# Patient Record
Sex: MALE | Race: WHITE | NOT HISPANIC OR LATINO | Employment: FULL TIME | URBAN - METROPOLITAN AREA
[De-identification: names, ages, dates, MRNs, and addresses within clinical notes are randomized per-mention and may not be internally consistent; named-entity substitution may affect disease eponyms.]

---

## 2017-01-08 ENCOUNTER — HOSPITAL ENCOUNTER (EMERGENCY)
Facility: CLINIC | Age: 32
Discharge: HOME | End: 2017-01-08
Attending: EMERGENCY MEDICINE | Admitting: EMERGENCY MEDICINE
Payer: COMMERCIAL

## 2017-01-08 VITALS
TEMPERATURE: 97.9 F | DIASTOLIC BLOOD PRESSURE: 92 MMHG | RESPIRATION RATE: 16 BRPM | SYSTOLIC BLOOD PRESSURE: 138 MMHG | HEART RATE: 65 BPM | OXYGEN SATURATION: 97 %

## 2017-01-08 DIAGNOSIS — S33.9XXA SPRAIN AND STRAIN OF LUMBOSACRAL JOINT/LIGAMENT, INITIAL ENCOUNTER: ICD-10-CM

## 2017-01-08 PROCEDURE — 99283 EMERGENCY DEPT VISIT LOW MDM: CPT | Performed by: EMERGENCY MEDICINE

## 2017-01-08 PROCEDURE — 99283 EMERGENCY DEPT VISIT LOW MDM: CPT

## 2017-01-08 RX ORDER — HYDROCODONE BITARTRATE AND ACETAMINOPHEN 5; 325 MG/1; MG/1
1 TABLET ORAL EVERY 4 HOURS PRN
Qty: 20 TABLET | Refills: 0 | Status: SHIPPED | OUTPATIENT
Start: 2017-01-08 | End: 2017-01-13

## 2017-01-08 ASSESSMENT — ENCOUNTER SYMPTOMS: BACK PAIN: 1

## 2017-01-08 NOTE — DISCHARGE INSTRUCTIONS
Ibuprofen for mild to moderate pain  Activity as tolerated-lifting as discussed/demonstrated  May apply ice to low back for discomfort  Norco as directed for severe pain  While on narcotics the patient should avoid driving, operating machinery, climbing, and alcohol.   Follow-up:  primary clinic provider -2 weeks if not resolved

## 2017-01-08 NOTE — ED AVS SNAPSHOT
[facility] Emergency Department    [address]    [facility] [address]    Phone:  [phone]    Fax:  [phone]                                       Julio Mccurdy   MRN: [id number]    Department:  [facility] Emergency Department   Date of Visit:  1/8/2017           Patient Information     Date Of Birth          1985        Your diagnoses for this visit were:     Sprain and strain of lumbosacral joint/ligament, initial encounter        You were seen by Sage Herrera DO.      Follow-up Information     Schedule an appointment as soon as possible for a visit with Clinic, [facility].    Why:  As needed    Contact information:    [facility] [facility] [id number] [phone]          Discharge Instructions       Ibuprofen for mild to moderate pain  Activity as tolerated-lifting as discussed/demonstrated  May apply ice to low back for discomfort  Norco as directed for severe pain  While on narcotics the patient should avoid driving, operating machinery, climbing, and alcohol.   Follow-up:  primary clinic provider -2 weeks if not resolved        24 Hour Appointment Hotline       To make an appointment at any [facility], call [phone] ([phone]). If you don't have a family doctor or clinic, we will help you find one. [facility] are conveniently located to serve the needs of you and your family.             Review of your medicines      CONTINUE these medicines which may have CHANGED, or have new prescriptions. If we are uncertain of the size of tablets/capsules you have at home, strength may be listed as something that might have changed.        Dose / Directions Last dose taken    HYDROcodone-acetaminophen 5-325 MG per tablet   Commonly known as:  NORCO   Dose:  1 tablet   What changed:  how much to take   Quantity:  20 tablet        Take 1 tablet by mouth every 4 hours as needed for moderate to severe pain   Refills:  0          Our records show  "that you are taking the medicines listed below. If these are incorrect, please call your family doctor or clinic.        Dose / Directions Last dose taken    albuterol 90 MCG/ACT inhaler   Dose:  2 puff   Quantity:  1 Inhaler        Inhale 2 puffs into the lungs every 4 hours as needed for shortness of breath / dyspnea.   Refills:  1        NO ACTIVE MEDICATIONS        Refills:  0                Prescriptions were sent or printed at these locations (1 Prescription)                   Other Prescriptions                Printed at Department/Unit printer (1 of 1)         HYDROcodone-acetaminophen (NORCO) 5-325 MG per tablet                Orders Needing Specimen Collection     None      Pending Results     No orders found from 2017 to 2017.            Pending Culture Results     No orders found from 2017 to 2017.             Test Results from your hospital stay            Thank you for choosing [facility]       Thank you for choosing [facility] for your care. Our goal is always to provide you with excellent care. Hearing back from our patients is one way we can continue to improve our services. Please take a few minutes to complete the written survey that you may receive in the mail after you visit with us. Thank you!        [facility] Information     [facility] lets you send messages to your doctor, view your test results, renew your prescriptions, schedule appointments and more. To sign up, go to [email or web address] . Click on \"Log in\" on the left side of the screen, which will take you to the Welcome page. Then click on \"Sign up Now\" on the right side of the page.     You will be asked to enter the access code listed below, as well as some personal information. Please follow the directions to create your username and password.     Your access code is: [email or web address]  Expires: 2017  9:17 AM     Your access code will  in 90 days. If you need help or a new code, please call your [facility] clinic or " [phone].        Care EveryWhere ID     This is your Care EveryWhere ID. This could be used by other organizations to access your [facility] medical records  [id number]        After Visit Summary       This is your record. Keep this with you and show to your community pharmacist(s) and doctor(s) at your next visit.

## 2017-01-08 NOTE — ED PROVIDER NOTES
"  History     Chief Complaint   Patient presents with     Back Pain     pain in low back after it \"popped\" last week.     HPI     Julio Mccurdy is a 31 year old male who presents to the emergency department for back pain. Patient explains the pain began last Wednesday, 1/4/17, after it \"popped.\" He admits that his back is sore on occasion but denies any major problems. Denies midline back pain or pain moving from his back down to legs. Patient has been taking ibuprofen to alleviate the pain. Patient is employed as a  which he believes may be contributing to his pain.     There is no problem list on file for this patient.    Current Outpatient Prescriptions   Medication Sig Dispense Refill     HYDROcodone-acetaminophen (NORCO) 5-325 MG per tablet Take 1 tablet by mouth every 4 hours as needed for moderate to severe pain 20 tablet 0     NO ACTIVE MEDICATIONS        albuterol 90 MCG/ACT inhaler Inhale 2 puffs into the lungs every 4 hours as needed for shortness of breath / dyspnea. 1 Inhaler 1     No Known Allergies    I have reviewed the Medications, Allergies, Past Medical and Surgical History, and Social History in the Epic system.    Review of Systems   Musculoskeletal: Positive for back pain (Lower ).     Constitutional: Negative.    HENT: Negative.    Eyes: Negative.    Respiratory: Negative.    Cardiovascular: Negative.    Gastrointestinal: Negative.    Endocrine: Negative.    Genitourinary: Negative.    Musculoskeletal: Negative.    Skin: Negative.    Allergic/Immunologic: Negative.    Neurological: Negative.    Hematological: Negative.    Psychiatric/Behavioral: Negative.    All other systems reviewed and are negative.      Physical Exam     BP: (!) 138/92 mmHg  Pulse: 65  Temp: 97.9  F (36.6  C)  Resp: 16  SpO2: 97 %    Physical Exam    No difficulty moving from sitting to standing position  Maintains normal upright posture  Normal cervical lordosis, thoracic kyphosis, lumbar lordosis  No " scoliosis  No midline pain palpating and percussing cervical thoracic lumbar , vertebrae in midline  Normal forward flexion, rotation left and right and side bending.  Some increased low back pain with extension  Lower extremities were normal muscle tone and bulk strength reflexes and sensation.  SLR testing negative  Skin color tone temperature lower extremities normal  ED Course   Procedures                 Labs Ordered and Resulted from Time of ED Arrival Up to the Time of Departure from the ED - No data to display  No results found for this or any previous visit (from the past 24 hour(s)).    Medications - No data to display    9:04 AM Patient Assessed.       Assessments & Plan (with Medical Decision Making)  Lumbar strain.  No discogenic component.  No radicular component.    Advise conservative care> If still bothersome and 2-33 weeks recommend following up with primary clinic provider.     I have reviewed the nursing notes.    I have reviewed the findings, diagnosis, plan and need for follow up with the patient.    Discharge Medication List as of 1/8/2017  9:18 AM          Final diagnoses:   Sprain and strain of lumbosacral joint/ligament, initial encounter     This document serves as a record of the services and decisions personally performed and made by Sage Herrera, *. It was created on HIS/HER behalf by Jaz Bowen, a trained medical scribe. The creation of this document is based the provider's statements to the medical scribe.  Jaz Bowen 9:04 AM 1/8/2017    Provider:   The information in this document, created by the medical scribe for me, accurately reflects the services I personally performed and the decisions made by me. I have reviewed and approved this document for accuracy prior to leaving the patient care area.  Sage Herrera, * 9:04 AM 1/8/2017 1/8/2017   [facility] EMERGENCY DEPARTMENT      Sage Herrera, DO  01/08/17 2014

## 2017-01-08 NOTE — ED AVS SNAPSHOT
[facility] Emergency Department    [address] [address]    Phone:  [phone]    Fax:  [phone]                                       Julio Mccurdy   MRN: [id number]    Department:  [facility] Emergency Department   Date of Visit:  1/8/2017           After Visit Summary Signature Page     I have received my discharge instructions, and my questions have been answered. I have discussed any challenges I see with this plan with the nurse or doctor.    ..........................................................................................................................................  Patient/Patient Representative Signature      ..........................................................................................................................................  Patient Representative Print Name and Relationship to Patient    ..................................................               ................................................  Date                                            Time    ..........................................................................................................................................  Reviewed by Signature/Title    ...................................................              ..............................................  Date                                                            Time

## 2017-10-02 ENCOUNTER — HOSPITAL ENCOUNTER (EMERGENCY)
Facility: CLINIC | Age: 32
Discharge: HOME | End: 2017-10-02
Attending: NURSE PRACTITIONER | Admitting: NURSE PRACTITIONER
Payer: COMMERCIAL

## 2017-10-02 VITALS
SYSTOLIC BLOOD PRESSURE: 128 MMHG | OXYGEN SATURATION: 98 % | RESPIRATION RATE: 20 BRPM | BODY MASS INDEX: 21.98 KG/M2 | TEMPERATURE: 98.2 F | HEART RATE: 87 BPM | HEIGHT: 68 IN | DIASTOLIC BLOOD PRESSURE: 84 MMHG | WEIGHT: 145 LBS

## 2017-10-02 DIAGNOSIS — S33.5XXA LUMBAR SPRAIN, INITIAL ENCOUNTER: Primary | ICD-10-CM

## 2017-10-02 DIAGNOSIS — S23.9XXA THORACIC SPRAIN: ICD-10-CM

## 2017-10-02 PROCEDURE — 99212 OFFICE O/P EST SF 10 MIN: CPT

## 2017-10-02 PROCEDURE — 99213 OFFICE O/P EST LOW 20 MIN: CPT | Performed by: NURSE PRACTITIONER

## 2017-10-02 RX ORDER — OXYCODONE AND ACETAMINOPHEN 5; 325 MG/1; MG/1
1 TABLET ORAL EVERY 4 HOURS PRN
Qty: 18 TABLET | Refills: 0 | Status: SHIPPED | OUTPATIENT
Start: 2017-10-02

## 2017-10-02 RX ORDER — KETOROLAC TROMETHAMINE 30 MG/ML
30 INJECTION, SOLUTION INTRAMUSCULAR; INTRAVENOUS ONCE
Status: DISCONTINUED | OUTPATIENT
Start: 2017-10-02 | End: 2017-10-02 | Stop reason: HOSPADM

## 2017-10-02 RX ORDER — KETOROLAC TROMETHAMINE 10 MG/1
10 TABLET, FILM COATED ORAL EVERY 6 HOURS PRN
Qty: 20 TABLET | Refills: 0 | Status: SHIPPED | OUTPATIENT
Start: 2017-10-02

## 2017-10-02 NOTE — ED AVS SNAPSHOT
[facility] Emergency Department    [address] [address]    Phone:  [phone]    Fax:  [phone]                                       Julio Mccurdy   MRN: [id number]    Department:  [facility] Emergency Department   Date of Visit:  10/2/2017           After Visit Summary Signature Page     I have received my discharge instructions, and my questions have been answered. I have discussed any challenges I see with this plan with the nurse or doctor.    ..........................................................................................................................................  Patient/Patient Representative Signature      ..........................................................................................................................................  Patient Representative Print Name and Relationship to Patient    ..................................................               ................................................  Date                                            Time    ..........................................................................................................................................  Reviewed by Signature/Title    ...................................................              ..............................................  Date                                                            Time

## 2017-10-02 NOTE — LETTER
[facility] EMERGENCY DEPARTMENT  [address] [address]  Phone: [phone]  Fax: [phone]    October 2, 2017        Julio Mccurdy  [address] [place] [address]          To whom it may concern:    RE: Julio Mccurdy    Patient was seen and treated today at our clinic and missed work.  Patient may return to work 10/4/2017 with the following:  No working or lifting restrictions    Please contact me for questions or concerns.      Sincerely,        Nikia SARMIENTO, CNM, FNP, DNP

## 2017-10-02 NOTE — ED PROVIDER NOTES
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  Pt states his back was tight this am and progressively got worse.  He states the pain is in the right lower back without radiation down the legs.  He describes the pain as stabbing.  He states it hurts to walk and sit.  The pain decreases with laying flat.  He went to the chiropractor and it did not help.  He reports taking tylenol 1000 mg and 800 mg of ibuprofen today without relief.  Pt reports last episode of back pain was about one year ago;  He states he came here and was advised there was nothing wrong and sent him out the door.    Pt reports occupation is .  Denies active medical problems.  Denies daily prescription medications.  Denies alcohol and recreational drug use and admits to smoking since age 13.  I have reviewed the Medications, Allergies, Past Medical and Surgical History, and Social History in the Epic system.    Review of Systems  10 point ROS of systems including Constitutional, Eyes, Respiratory, Cardiovascular, Gastroenterology, Genitourinary, Integumentary, Muscularskeletal, Psychiatric were all negative except for pertinent positives noted in my HPI.    Physical Exam   BP: 128/84  Pulse: 87  Heart Rate: 87  Temp: 98.2  F (36.8  C)  Resp: 20  Height: 172.7 cm (5' 8\")  Weight: 65.8 kg (145 lb)  SpO2: 98 %  Physical Exam   Constitutional: He appears well-developed and well-nourished. No distress.   Cardiovascular: Normal rate, regular rhythm and normal heart sounds.  Exam reveals no gallop and no friction rub.    No murmur heard.  Pulmonary/Chest: Effort normal and breath sounds normal. No respiratory distress. He has no wheezes. He has no rales. He exhibits no tenderness.   Musculoskeletal:        Thoracic back: He exhibits tenderness, pain and spasm. He exhibits normal range of motion, " no bony tenderness, no swelling, no edema, no deformity (lower thoracic paraspinous region) and no laceration (lower thoracic right worse than left paraspinous region).        Lumbar back: He exhibits tenderness (lumbar region right side), pain and spasm. He exhibits normal range of motion, no bony tenderness, no swelling (lumbar region right side), no edema, no deformity (paraspinous muscle region right side) and no laceration.   Neurovascularly intact  Bilateral straight leg raise normal  Spine inspected and normal curvature noted  Strength equal   Skin: He is not diaphoretic.   Psychiatric: He has a normal mood and affect. His behavior is normal.   Nursing note and vitals reviewed.      ED Course     ED Course     Procedures    Labs Ordered and Resulted from Time of ED Arrival Up to the Time of Departure from the ED - No data to display    Assessments & Plan (with Medical Decision Making)     I have reviewed the nursing notes.    I have reviewed the findings, diagnosis, plan and need for follow up with the patient.  Julio Mccurdy is a 32 year old male who presents with back pain.  Pt states his back was tight this am and progressively got worse.  He states the pain is in the right lower back without radiation down the legs.  He describes the pain as stabbing.  He states it hurts to walk and sit.  The pain decreases with laying flat.  He went to the chiropractor and it did not help.  He reports taking tylenol 1000 mg and 800 mg of ibuprofen today without relief.  Pt reports last episode of back pain was about one year ago;  He states he came here and was advised there was nothing wrong and sent him out the door.    Pt reports occupation is .  Denies active medical problems.  Denies daily prescription medications.  Denies alcohol and recreational drug use and admits to smoking since age 13.  Exam as noted above; no red flags to indicate imaging. DDx: acute muscle strain, muscle spasm, herniated disc,  cauda equina, spinal fracture, spinal stenosis, sciatica, degenerative disease, ligamentous injury, spondylolisthesis, epidural abscess, osteomyelitis, AAA, abdominal etiologies .  Reviewed pain management and recommend start core body strengthening to prevent further problems in the future.    Discharge Medication List as of 10/2/2017  6:48 PM      START taking these medications    Details   oxyCODONE-acetaminophen (PERCOCET) 5-325 MG per tablet Take 1 tablet by mouth every 4 hours as needed for pain maximum 6 tablet(s) per day, Disp-18 tablet, R-0, Local Print      ketorolac (TORADOL) 10 MG tablet Take 1 tablet (10 mg) by mouth every 6 hours as needed for moderate pain, Disp-20 tablet, R-0, E-Prescribe             Final diagnoses:   Thoracic sprain   Lumbar sprain, initial encounter       10/2/2017   [facility] EMERGENCY DEPARTMENT     Nikia Cobb APRN CNP  10/02/17 2028

## 2017-10-02 NOTE — ED AVS SNAPSHOT
[facility] Emergency Department    [address]    [facility] [place] [address]    Phone:  [phone]    Fax:  [phone]                                       Julio Mccurdy   MRN: [id number]    Department:  [facility] Emergency Department   Date of Visit:  10/2/2017           Patient Information     Date Of Birth          1985        Your diagnoses for this visit were:     Thoracic sprain     Lumbar sprain, initial encounter        You were seen by Nikia Cobb APRN CNP.      Follow-up Information     Follow up with [facility], [facility] In 1 week.    Why:  If symptoms worsen, As needed    Contact information:    [facility] [address]  [phone]          Discharge Instructions         Use toradol 10 mg every 6-8 hours per day if needed for pain. Stop if it is causing nausea or abdominal pain.   Add Percocet 5-325 (oxycodone-acetaminophen) 1-2 pills up to every 4 hours if needed for pain. Do not use alcohol, operate machinery, drive, or climb on ladders for 8 hours after taking Percocet. Use docusate (100mg) 2 times a day to prevent constipation while on narcotics.    Possible Causes of Low Back or Leg Pain    The symptoms in your back or leg may be due to pressure on a nerve. This pressure may be caused by a damaged disk or by abnormal bone growth. Either way, you may feel pain, burning, tingling, or numbness. If you have pressure on a nerve that connects to the sciatic nerve, pain may shoot down your leg.    Pressure from the disk  Constant wear and tear can weaken a disk over time and cause back pain. The disk can then be damaged by a sudden movement or injury. If its soft center begins to bulge, the disk may press on a nerve. Or the outside of the disk may tear, and the soft center may squeeze through and pinch a nerve.    Pressure from bone  As a disk wears out, the vertebrae right above and below the disk begin to touch. This can put pressure on a nerve.  Often, abnormal bone (called bone spurs) grows where the vertebrae rub against each other. This can cause the foramen or the spinal canal to narrow (called stenosis) and press against a nerve.  Date Last Reviewed: 10/4/2015    1550-2051 [employer]. [address] [place] [address], [place] [address]. All rights reserved. This information is not intended as a substitute for professional medical care. Always follow your healthcare professional's instructions.          Relieving Back Pain  Back pain is a common problem. You can strain back muscles by lifting too much weight or just by moving the wrong way. Back strain can be uncomfortable, even painful. And it can take weeks or months to improve. To help yourself feel better and prevent future back strains, try these tips.  Important Note: Do not give aspirin to children or teens without first discussing it with your healthcare provider.      ? Ice    Ice reduces muscle pain and swelling. It helps most during the first 24 to 48 hours after an injury.    Wrap an ice pack or a bag of frozen peas in a thin towel. (Never place ice directly on your skin.)    Place the ice where your back hurts the most.    Don t ice for more than 20 minutes at a time.    You can use ice several times a day.  ? Medicines  Over-the-counter pain relievers can include acetaminophen and anti-inflammatory medicines, which includes aspirin or ibuprofen. They can help ease discomfort. Some also reduce swelling.    Tell your healthcare provider about any medicines you are already taking.    Take medicines only as directed.  ? Heat  After the first 48 hours, heat can relax sore muscles and improve blood flow.    Try a warm bath or shower. Or use a heating pad set on low. To prevent a burn, keep a cloth between you and the heating pad.    Don t use a heating pad for more than 15 minutes at a time. Never sleep on a heating pad.  Date Last Reviewed: 9/1/2015 2000-2017 [employer]  [address] [place] [address]. All rights reserved. This information is not intended as a substitute for professional medical care. Always follow your healthcare professional's instructions.          24 Hour Appointment Hotline       To make an appointment at any [facility], call [phone] ([phone]). If you don't have a family doctor or clinic, we will help you find one. [facility] clinics are conveniently located to serve the needs of you and your family.             Review of your medicines      START taking        Dose / Directions Last dose taken    ketorolac 10 MG tablet   Commonly known as:  TORADOL   Dose:  10 mg   Quantity:  20 tablet        Take 1 tablet (10 mg) by mouth every 6 hours as needed for moderate pain   Refills:  0        oxyCODONE-acetaminophen 5-325 MG per tablet   Commonly known as:  PERCOCET   Dose:  1 tablet   Quantity:  18 tablet        Take 1 tablet by mouth every 4 hours as needed for pain maximum 6 tablet(s) per day   Refills:  0          Our records show that you are taking the medicines listed below. If these are incorrect, please call your family doctor or clinic.        Dose / Directions Last dose taken    albuterol 90 MCG/ACT inhaler   Dose:  2 puff   Quantity:  1 Inhaler        Inhale 2 puffs into the lungs every 4 hours as needed for shortness of breath / dyspnea.   Refills:  1        NO ACTIVE MEDICATIONS        Refills:  0                Prescriptions were sent or printed at these locations (2 Prescriptions)                   [facility] [address]   [address] [facility] [address]    Telephone:  [phone]   Fax:  [phone]   Hours:                  E-Prescribed (1 of 2)         ketorolac (TORADOL) 10 MG tablet                 Printed at Department/Unit printer (1 of 2)         oxyCODONE-acetaminophen (PERCOCET) 5-325 MG per tablet                Orders Needing Specimen Collection     None      Pending  "Results     No orders found from 2017 to 10/3/2017.            Pending Culture Results     No orders found from 2017 to 10/3/2017.            Pending Results Instructions     If you had any lab results that were not finalized at the time of your Discharge, you can call the ED Lab Result RN at [phone]. You will be contacted by this team for any positive Lab results or changes in treatment. The nurses are available 7 days a week from 10A to 6:30P.  You can leave a message 24 hours per day and they will return your call.        Test Results From Your Hospital Stay               Thank you for choosing [facility]       Thank you for choosing [facility] for your care. Our goal is always to provide you with excellent care. Hearing back from our patients is one way we can continue to improve our services. Please take a few minutes to complete the written survey that you may receive in the mail after you visit with us. Thank you!        [employer] Information     [employer]Stage lets you send messages to your doctor, view your test results, renew your prescriptions, schedule appointments and more. To sign up, go to [email or web address] . Click on \"Log in\" on the left side of the screen, which will take you to the Welcome page. Then click on \"Sign up Now\" on the right side of the page.     You will be asked to enter the access code listed below, as well as some personal information. Please follow the directions to create your username and password.     Your access code is: [email or web address]  Expires: 2017  6:48 PM     Your access code will  in 90 days. If you need help or a new code, please call your [facility] clinic or [phone].        Care EveryWhere ID     This is your Care EveryWhere ID. This could be used by other organizations to access your [facility] medical records  [id number]        Equal Access to Services     ALEXANDER BELL : gisel Méndez, fernie baptiste, " carol gonzalez'aan ah. So [facility] [phone].    ATENCIÓN: Si habla español, tiene a sebastian disposición servicios gratuitos de asistencia lingüística. Llame al [phone].    We comply with applicable federal civil rights laws and [place] laws. We do not discriminate on the basis of race, color, national origin, age, disability, sex, sexual orientation, or gender identity.            After Visit Summary       This is your record. Keep this with you and show to your community pharmacist(s) and doctor(s) at your next visit.

## 2017-10-02 NOTE — DISCHARGE INSTRUCTIONS
Use toradol 10 mg every 6-8 hours per day if needed for pain. Stop if it is causing nausea or abdominal pain.   Add Percocet 5-325 (oxycodone-acetaminophen) 1-2 pills up to every 4 hours if needed for pain. Do not use alcohol, operate machinery, drive, or climb on ladders for 8 hours after taking Percocet. Use docusate (100mg) 2 times a day to prevent constipation while on narcotics.    Possible Causes of Low Back or Leg Pain    The symptoms in your back or leg may be due to pressure on a nerve. This pressure may be caused by a damaged disk or by abnormal bone growth. Either way, you may feel pain, burning, tingling, or numbness. If you have pressure on a nerve that connects to the sciatic nerve, pain may shoot down your leg.    Pressure from the disk  Constant wear and tear can weaken a disk over time and cause back pain. The disk can then be damaged by a sudden movement or injury. If its soft center begins to bulge, the disk may press on a nerve. Or the outside of the disk may tear, and the soft center may squeeze through and pinch a nerve.    Pressure from bone  As a disk wears out, the vertebrae right above and below the disk begin to touch. This can put pressure on a nerve. Often, abnormal bone (called bone spurs) grows where the vertebrae rub against each other. This can cause the foramen or the spinal canal to narrow (called stenosis) and press against a nerve.  Date Last Reviewed: 10/4/2015    6543-9026 [employer]. [address] [place] [address], [place] [address]. All rights reserved. This information is not intended as a substitute for professional medical care. Always follow your healthcare professional's instructions.          Relieving Back Pain  Back pain is a common problem. You can strain back muscles by lifting too much weight or just by moving the wrong way. Back strain can be uncomfortable, even painful. And it can take weeks or months to improve. To help yourself feel better and prevent  future back strains, try these tips.  Important Note: Do not give aspirin to children or teens without first discussing it with your healthcare provider.      ? Ice    Ice reduces muscle pain and swelling. It helps most during the first 24 to 48 hours after an injury.    Wrap an ice pack or a bag of frozen peas in a thin towel. (Never place ice directly on your skin.)    Place the ice where your back hurts the most.    Don t ice for more than 20 minutes at a time.    You can use ice several times a day.  ? Medicines  Over-the-counter pain relievers can include acetaminophen and anti-inflammatory medicines, which includes aspirin or ibuprofen. They can help ease discomfort. Some also reduce swelling.    Tell your healthcare provider about any medicines you are already taking.    Take medicines only as directed.  ? Heat  After the first 48 hours, heat can relax sore muscles and improve blood flow.    Try a warm bath or shower. Or use a heating pad set on low. To prevent a burn, keep a cloth between you and the heating pad.    Don t use a heating pad for more than 15 minutes at a time. Never sleep on a heating pad.  Date Last Reviewed: 9/1/2015 2000-2017 [employer]. [address] [place] [address], [place] [address]. All rights reserved. This information is not intended as a substitute for professional medical care. Always follow your healthcare professional's instructions.

## 2017-10-14 ENCOUNTER — HOSPITAL ENCOUNTER (EMERGENCY)
Facility: CLINIC | Age: 32
Discharge: HOME | End: 2017-10-14
Attending: FAMILY MEDICINE | Admitting: FAMILY MEDICINE
Payer: COMMERCIAL

## 2017-10-14 VITALS
TEMPERATURE: 97.9 F | HEART RATE: 79 BPM | WEIGHT: 145.06 LBS | RESPIRATION RATE: 16 BRPM | BODY MASS INDEX: 22.06 KG/M2 | OXYGEN SATURATION: 100 % | DIASTOLIC BLOOD PRESSURE: 67 MMHG | SYSTOLIC BLOOD PRESSURE: 120 MMHG

## 2017-10-14 DIAGNOSIS — M54.50 ACUTE MIDLINE LOW BACK PAIN WITHOUT SCIATICA: ICD-10-CM

## 2017-10-14 PROCEDURE — 25000132 ZZH RX MED GY IP 250 OP 250 PS 637: Performed by: FAMILY MEDICINE

## 2017-10-14 PROCEDURE — 99283 EMERGENCY DEPT VISIT LOW MDM: CPT | Mod: Z6 | Performed by: FAMILY MEDICINE

## 2017-10-14 PROCEDURE — 99283 EMERGENCY DEPT VISIT LOW MDM: CPT

## 2017-10-14 RX ORDER — SULINDAC 200 MG/1
200 TABLET ORAL ONCE
Status: COMPLETED | OUTPATIENT
Start: 2017-10-14 | End: 2017-10-14

## 2017-10-14 RX ORDER — LIDOCAINE 50 MG/G
1 PATCH TOPICAL
Status: DISCONTINUED | OUTPATIENT
Start: 2017-10-14 | End: 2017-10-14 | Stop reason: HOSPADM

## 2017-10-14 RX ORDER — SULINDAC 200 MG/1
200 TABLET ORAL 2 TIMES DAILY WITH MEALS
Qty: 20 TABLET | Refills: 0 | Status: SHIPPED | OUTPATIENT
Start: 2017-10-14

## 2017-10-14 RX ADMIN — LIDOCAINE 1 PATCH: 50 PATCH TOPICAL at 13:54

## 2017-10-14 RX ADMIN — SULINDAC 200 MG: 200 TABLET ORAL at 13:55

## 2017-10-14 ASSESSMENT — ENCOUNTER SYMPTOMS
DYSURIA: 0
SORE THROAT: 0
PALPITATIONS: 0
ABDOMINAL PAIN: 0
WHEEZING: 0
VOMITING: 0
SHORTNESS OF BREATH: 0
NAUSEA: 0
SINUS PRESSURE: 0
DIAPHORESIS: 0
HEADACHES: 0
COUGH: 0
CHILLS: 0
BACK PAIN: 1
CONSTIPATION: 0
BLOOD IN STOOL: 0
DIARRHEA: 0
FEVER: 0
FREQUENCY: 0

## 2017-10-14 NOTE — DISCHARGE INSTRUCTIONS
ICD-10-CM    1. Acute midline low back pain without sciatica M54.5     Suspec disc injury without nerve impingement.  Return for incontinence/retention of urine ot stool, foot drop, numbness inner thighs Take sulindac 200 mg twice daily for 10 days with food or milk. do not use with ketorolac or ibuprofen.  Maintain low back range of motion.  Physical therapy consult - consider traction..   Lidoderm patch midline. may replace with OTC Lidocaine 4% patch. off 12 hours of every 24 hours.         Back Pain (Acute or Chronic)    Back pain is one of the most common problems. The good news is that most people feel better in 1 to 2 weeks, and most of the rest in 1 to 2 months. Most people can remain active.  People experience and describe pain differently; not everyone is the same.    The pain can be sharp, stabbing, shooting, aching, cramping or burning.    Movement, standing, bending, lifting, sitting, or walking may worsen pain.    It can be localized to one spot or area, or it can be more generalized.    It can spread or radiate upwards, to the front, or go down your arms or legs (sciatica).    It can cause muscle spasm.  Most of the time, mechanical problems with the muscles or spine cause the pain. Mechanical problems are usually caused by an injury to the muscles or ligaments. While illness can cause back pain, it is usually not caused by a serious illness. Mechanical problems include:     Physical activity such as sports, exercise, work, or normal activity    Overexertion, lifting, pushing, pulling incorrectly or too aggressively    Sudden twisting, bending, or stretching from an accident, or accidental movement    Poor posture    Stretching or moving wrong, without noticing pain at the time    Poor coordination, lack of regular exercise (check with your doctor about this)    Spinal disc disease or arthritis    Stress  Pain can also be related to pregnancy, or illness like appendicitis, bladder or kidney  infections, pelvic infections, and many other things.  Acute back pain usually gets better in 1 to 2 weeks. Back pain related to disk disease, arthritis in the spinal joints or spinal stenosis (narrowing of the spinal canal) can become chronic and last for months or years.  Unless you had a physical injury (for example, a car accident or fall) X-rays are usually not needed for the initial evaluation of back pain. If pain continues and does not respond to medical treatment, X-rays and other tests may be needed.  Home care  Try these home care recommendations:    When in bed, try to find a position of comfort. A firm mattress is best. Try lying flat on your back with pillows under your knees. You can also try lying on your side with your knees bent up towards your chest and a pillow between your knees.    At first, do not try to stretch out the sore spots. If there is a strain, it is not like the good soreness you get after exercising without an injury. In this case, stretching may make it worse.    Avoid prolong sitting, long car rides, or travel. This puts more stress on the lower back than standing or walking.    During the first 24 to 72 hours after an acute injury or flare up of chronic back pain, apply an ice pack to the painful area for 20 minutes and then remove it for 20 minutes. Do this over a period of 60 to 90 minutes or several times a day. This will reduce swelling and pain. Wrap the ice pack in a thin towel or plastic to protect your skin.    You can start with ice, then switch to heat. Heat (hot shower, hot bath, or heating pad) reduces pain and works well for muscle spasms. Heat can be applied to the painful area for 20 minutes then remove it for 20 minutes. Do this over a period of 60 to 90 minutes or several times a day. Do not sleep on a heating pad. It can lead to skin burns or tissue damage.    You can alternate ice and heat therapy. Talk with your doctor about the best treatment for your back  pain.    Therapeutic massage can help relax the back muscles without stretching them.    Be aware of safe lifting methods and do not lift anything without stretching first.  Medicines  Talk to your doctor before using medicine, especially if you have other medical problems or are taking other medicines.    You may use over-the-counter medicine as directed on the bottle to control pain, unless another pain medicine was prescribed. If you have chronic conditions like diabetes, liver or kidney disease, stomach ulcers, or gastrointestinal bleeding, or are taking blood thinners, talk to your doctor before taking any medicine.    Be careful if you are given a prescription medicines, narcotics, or medicine for muscle spasms. They can cause drowsiness, affect your coordination, reflexes, and judgement. Do not drive or operate heavy machinery.  Follow-up care  Follow up with your healthcare provider, or as advised.   A radiologist will review any X-rays that were taken. Your provide will notify you of any new findings that may affect your care.  Call 911  Call emergency services if any of the following occur:    Trouble breathing    Confusion    Very drowsy or trouble awakening    Fainting or loss of consciousness    Rapid or very slow heart rate    Loss of bowel or bladder control  When to seek medical advice  Call your healthcare provider right away if any of these occur:     Pain becomes worse or spreads to your legs    Weakness or numbness in one or both legs    Numbness in the groin or genital area  Date Last Reviewed: 7/1/2016 2000-2017 [employer]. [address] [place] [address]. All rights reserved. This information is not intended as a substitute for professional medical care. Always follow your healthcare professional's instructions.

## 2017-10-14 NOTE — ED NOTES
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 He states the pain is just a little bit better then 2 weeks ago

## 2017-10-14 NOTE — ED AVS SNAPSHOT
[facility] Emergency Department    [address] [facility] [address]    Phone:  [phone]    Fax:  [phone]                                       Julio Mccurdy   MRN: [id number]    Department:  [facility] Emergency Department   Date of Visit:  10/14/2017           After Visit Summary Signature Page     I have received my discharge instructions, and my questions have been answered. I have discussed any challenges I see with this plan with the nurse or doctor.    ..........................................................................................................................................  Patient/Patient Representative Signature      ..........................................................................................................................................  Patient Representative Print Name and Relationship to Patient    ..................................................               ................................................  Date                                            Time    ..........................................................................................................................................  Reviewed by Signature/Title    ...................................................              ..............................................  Date                                                            Time

## 2017-10-14 NOTE — ED PROVIDER NOTES
History     Chief Complaint   Patient presents with     Back Pain     low back pain for 2 weeks, seen in  not getting better.     HPI  Julio Mccurdy is a 32 year old male who was seen in the  here for low back pain 10/2 and again at [facility]  onset with tightening in the am on 10/2 and RT low back without radiculopathy and stabbing pain, worse with sitting and walking. better with lying flat. saw chiro - no benefit.  Taking ibu and tylenol.  prior low back pain 1 year prior.    since those visits persistent pain low back and bilateral without radiculopathy.   no injury. works as  but without obvious acute injury.   Tylenol, and percocet, and toradol, flexeril since then   No back surgery. No fever  No symptoms suggestive of  cauda equina syndrome (central spinal stenosis) such as incontinence or retention of urine or stool, inner thigh numbness or foot drop.   NO ureterolithaisis    I have reviewed the Medications, Allergies, Past Medical and Surgical History, and Social History in the Epic system.    Allergies: No Known Allergies      No current facility-administered medications on file prior to encounter.   Current Outpatient Prescriptions on File Prior to Encounter:  oxyCODONE-acetaminophen (PERCOCET) 5-325 MG per tablet Take 1 tablet by mouth every 4 hours as needed for pain maximum 6 tablet(s) per day   ketorolac (TORADOL) 10 MG tablet Take 1 tablet (10 mg) by mouth every 6 hours as needed for moderate pain   NO ACTIVE MEDICATIONS    albuterol 90 MCG/ACT inhaler Inhale 2 puffs into the lungs every 4 hours as needed for shortness of breath / dyspnea.       There is no problem list on file for this patient.      No past surgical history on file.    Social History   Substance Use Topics     Smoking status: Current Every Day Smoker     Packs/day: 1.00     Smokeless tobacco: Former User     Alcohol use No         There is no immunization history on file for this patient.    BMI: Estimated  "body mass index is 22.06 kg/(m^2) as calculated from the following:    Height as of 10/2/17: 1.727 m (5' 8\").    Weight as of this encounter: 65.8 kg (145 lb 1 oz).      Review of Systems   Constitutional: Negative for chills, diaphoresis and fever.   HENT: Negative for ear pain, sinus pressure and sore throat.    Eyes: Negative for visual disturbance.   Respiratory: Negative for cough, shortness of breath and wheezing.    Cardiovascular: Negative for chest pain and palpitations.   Gastrointestinal: Negative for abdominal pain, blood in stool, constipation, diarrhea, nausea and vomiting.   Genitourinary: Negative for dysuria, frequency and urgency.   Musculoskeletal: Positive for back pain.   Skin: Negative for rash.   Neurological: Negative for headaches.   All other systems reviewed and are negative.      Physical Exam   BP: 136/85  Pulse: 79  Temp: 97.9  F (36.6  C)  Resp: 16  Weight: 65.8 kg (145 lb 1 oz)  SpO2: 100 %       Physical Exam   GENERAL: Alert and mild-mod distress  CARDIOVASCULAR: Peripheral pulses are palpable  GASTROINTESTINAL: No abdominal tenderness, mass or organomegaly    MUSCULOSKELTAL:  Lumbosacral spine area reveals local tenderness low lumbar back.  Pain is worse with forward bending but not with lateral bending, extension, rotation.  Straight leg raise      Sitting: Left (-) Right (-)      Lying: Left (-) Right (-)  SI Joint Testing      Figure of four testing (-)    Other joints      Hips: full range of motion without pain.      Knees: full range of motion without pain.    NEURO:      Deep Tendon Reflexes: Present and symmetric      Motor strength: 5/5 throughout      Sensation to light touch intact      Heel and toe gait normal      ED Course     ED Course     Procedures               Critical Care time:  none               Labs Ordered and Resulted from Time of ED Arrival Up to the Time of Departure from the ED - No data to display    Assessments & Plan (with Medical Decision Making) "     MDM: Julio Mccurdy is a 32 year old male who presented with low back pain for the last 2 weeks with persistent symptoms but not radicular and without red flag symptoms or signs.  I do suspect this is likely a lumbar disc without radicular symptoms.  Symptoms are worse while he is sitting.  He has no associated paraspinous muscle spasm.  He has no indications for imaging at this time.  I did recommend he follow up in clinic with sports medicine or with his primary doctor.  Consults been given.  I also recommended physical therapy or chiropractor.  Discussed other symptomatic management.  Cautions given for return.    I have reviewed the nursing notes.    I have reviewed the findings, diagnosis, plan and need for follow up with the patient.       New Prescriptions    No medications on file       Final diagnoses:   Acute midline low back pain without sciatica - Suspec disc injury without nerve impingement.  Return for incontinence/retention of urine ot stool, foot drop, numbness inner thighs Take sulindac 200 mg twice daily for 10 days with food or milk. do not use with ketorolac or ibuprofen.  Maintain low back range of motion.  Physical therapy consult - consider traction..   Lidoderm patch midline. may replace with OTC Lidocaine 4% patch. off 12 hours of every 24 hours.       10/14/2017   [facility] EMERGENCY DEPARTMENT     Bhupinder Bynum MD  10/14/17 2015

## 2017-10-14 NOTE — ED AVS SNAPSHOT
[facility] Emergency Department    [address] [address]    Phone:  [phone]    Fax:  [phone]                                       Julio Mccurdy   MRN: [id number]    Department:  [facility] Emergency Department   Date of Visit:  10/14/2017           Patient Information     Date Of Birth          1985        Your diagnoses for this visit were:     Acute midline low back pain without sciatica Suspec disc injury without nerve impingement.  Return for incontinence/retention of urine ot stool, foot drop, numbness inner thighs Take sulindac 200 mg twice daily for 10 days with food or milk. do not use with ketorolac or ibuprofen.  Maintain low back range of motion.  Physical therapy consult - consider traction..   Lidoderm patch midline. may replace with OTC Lidocaine 4% patch. off 12 hours of every 24 hours.       You were seen by Bhupinder Bynum MD.      Follow-up Information     Follow up with [facility] Emergency Department.    Specialty:  EMERGENCY MEDICINE    Why:  As needed, If symptoms worsen    Contact information:    [address] [place] [id number] [phone]    Additional information:    The medical center is located at   [address] (between [address] in [place], [place] miles north   of [place]).        Follow up with Clinic, [facility] [place] In 1 week.    Contact information:    [address] [place] [id number] [phone]          Discharge Instructions         ICD-10-CM    1. Acute midline low back pain without sciatica M54.5     Suspec disc injury without nerve impingement.  Return for incontinence/retention of urine ot stool, foot drop, numbness inner thighs Take sulindac 200 mg twice daily for 10 days with food or milk. do not use with ketorolac or ibuprofen.  Maintain low back range of motion.  Physical therapy consult - consider traction..   Lidoderm patch midline. may replace with OTC Lidocaine 4%  patch. off 12 hours of every 24 hours.         Back Pain (Acute or Chronic)    Back pain is one of the most common problems. The good news is that most people feel better in 1 to 2 weeks, and most of the rest in 1 to 2 months. Most people can remain active.  People experience and describe pain differently; not everyone is the same.    The pain can be sharp, stabbing, shooting, aching, cramping or burning.    Movement, standing, bending, lifting, sitting, or walking may worsen pain.    It can be localized to one spot or area, or it can be more generalized.    It can spread or radiate upwards, to the front, or go down your arms or legs (sciatica).    It can cause muscle spasm.  Most of the time, mechanical problems with the muscles or spine cause the pain. Mechanical problems are usually caused by an injury to the muscles or ligaments. While illness can cause back pain, it is usually not caused by a serious illness. Mechanical problems include:     Physical activity such as sports, exercise, work, or normal activity    Overexertion, lifting, pushing, pulling incorrectly or too aggressively    Sudden twisting, bending, or stretching from an accident, or accidental movement    Poor posture    Stretching or moving wrong, without noticing pain at the time    Poor coordination, lack of regular exercise (check with your doctor about this)    Spinal disc disease or arthritis    Stress  Pain can also be related to pregnancy, or illness like appendicitis, bladder or kidney infections, pelvic infections, and many other things.  Acute back pain usually gets better in 1 to 2 weeks. Back pain related to disk disease, arthritis in the spinal joints or spinal stenosis (narrowing of the spinal canal) can become chronic and last for months or years.  Unless you had a physical injury (for example, a car accident or fall) X-rays are usually not needed for the initial evaluation of back pain. If pain continues and does not respond to  medical treatment, X-rays and other tests may be needed.  Home care  Try these home care recommendations:    When in bed, try to find a position of comfort. A firm mattress is best. Try lying flat on your back with pillows under your knees. You can also try lying on your side with your knees bent up towards your chest and a pillow between your knees.    At first, do not try to stretch out the sore spots. If there is a strain, it is not like the good soreness you get after exercising without an injury. In this case, stretching may make it worse.    Avoid prolong sitting, long car rides, or travel. This puts more stress on the lower back than standing or walking.    During the first 24 to 72 hours after an acute injury or flare up of chronic back pain, apply an ice pack to the painful area for 20 minutes and then remove it for 20 minutes. Do this over a period of 60 to 90 minutes or several times a day. This will reduce swelling and pain. Wrap the ice pack in a thin towel or plastic to protect your skin.    You can start with ice, then switch to heat. Heat (hot shower, hot bath, or heating pad) reduces pain and works well for muscle spasms. Heat can be applied to the painful area for 20 minutes then remove it for 20 minutes. Do this over a period of 60 to 90 minutes or several times a day. Do not sleep on a heating pad. It can lead to skin burns or tissue damage.    You can alternate ice and heat therapy. Talk with your doctor about the best treatment for your back pain.    Therapeutic massage can help relax the back muscles without stretching them.    Be aware of safe lifting methods and do not lift anything without stretching first.  Medicines  Talk to your doctor before using medicine, especially if you have other medical problems or are taking other medicines.    You may use over-the-counter medicine as directed on the bottle to control pain, unless another pain medicine was prescribed. If you have chronic  conditions like diabetes, liver or kidney disease, stomach ulcers, or gastrointestinal bleeding, or are taking blood thinners, talk to your doctor before taking any medicine.    Be careful if you are given a prescription medicines, narcotics, or medicine for muscle spasms. They can cause drowsiness, affect your coordination, reflexes, and judgement. Do not drive or operate heavy machinery.  Follow-up care  Follow up with your healthcare provider, or as advised.   A radiologist will review any X-rays that were taken. Your provide will notify you of any new findings that may affect your care.  Call 911  Call emergency services if any of the following occur:    Trouble breathing    Confusion    Very drowsy or trouble awakening    Fainting or loss of consciousness    Rapid or very slow heart rate    Loss of bowel or bladder control  When to seek medical advice  Call your healthcare provider right away if any of these occur:     Pain becomes worse or spreads to your legs    Weakness or numbness in one or both legs    Numbness in the groin or genital area  Date Last Reviewed: 7/1/2016 2000-2017 [employer]. [address] [place] [address]. All rights reserved. This information is not intended as a substitute for professional medical care. Always follow your healthcare professional's instructions.          24 Hour Appointment Hotline       To make an appointment at any [facility] clinic, call [phone] ([phone]). If you don't have a family doctor or clinic, we will help you find one. [facility] clinics are conveniently located to serve the needs of you and your family.          ED Discharge Orders     [facility] PT, HAND, AND CHIR[facility]RACTIC REFERRAL       **This order will print in the [facility] Scheduling Office**    Physical Therapy, Hand Therapy and Chiropractic Care are available through:    *[facility]  *[facility]  *[facility]    Call one  number to schedule at any of the above locations: [phone].    Your provider has referred you to: [facility] - PT and/or Chiropractic Care determined by clinical presentation at [facility] or [facility]    Indication/Reason for Referral: lumbar back pain  Onset of Illness: October 2 , 2017   Therapy Orders: Evaluate and Treat  Special Programs:   Special Request:     Aleshia Monk      Additional Comments for the Therapist or Chiropractor: consider empiric traction trial  longterm prevention strategy     Please be aware that coverage of these services is subject to the terms and limitations of your health insurance plan.  Call member services at your health plan with any benefit or coverage questions.      Please bring the following to your appointment:    *Your personal calendar for scheduling future appointments  *Comfortable clothing            ORTHO  REFERRAL       [facility] is referring you to the Orthopedic  Services at [facility].       The  Representative will assist you in the coordination of your Orthopedic and Musculoskeletal Care as prescribed by your physician.    The  Representative will call you within 1 business day to help schedule your appointment, or you may contact the  Representative at:    All areas ~ [phone]     Type of Referral : Non Surgical       Timeframe requested: Within 1-2 weeks    Coverage of these services is subject to the terms and limitations of your health insurance plan.  Please call member services at your health plan with any benefit or coverage questions.      If X-rays, CT or MRI's have been performed, please contact the facility where they were done to arrange for , prior to your scheduled appointment.  Please bring this referral request to your appointment and present it to your specialist.                     Review of your medicines      START taking         Dose / Directions Last dose taken    sulindac 200 MG tablet   Commonly known as:  CLINORIL   Dose:  200 mg   Quantity:  20 tablet        Take 1 tablet (200 mg) by mouth 2 times daily (with meals)   Refills:  0          Our records show that you are taking the medicines listed below. If these are incorrect, please call your family doctor or clinic.        Dose / Directions Last dose taken    albuterol 90 MCG/ACT inhaler   Dose:  2 puff   Quantity:  1 Inhaler        Inhale 2 puffs into the lungs every 4 hours as needed for shortness of breath / dyspnea.   Refills:  1        ketorolac 10 MG tablet   Commonly known as:  TORADOL   Dose:  10 mg   Quantity:  20 tablet        Take 1 tablet (10 mg) by mouth every 6 hours as needed for moderate pain   Refills:  0        NO ACTIVE MEDICATIONS        Refills:  0        oxyCODONE-acetaminophen 5-325 MG per tablet   Commonly known as:  PERCOCET   Dose:  1 tablet   Quantity:  18 tablet        Take 1 tablet by mouth every 4 hours as needed for pain maximum 6 tablet(s) per day   Refills:  0                Prescriptions were sent or printed at these locations (1 Prescription)                   [facility] [address]   [address] [address]    Telephone:  [phone]   Fax:  [phone]   Hours:                  E-Prescribed (1 of 1)         sulindac (CLINORIL) 200 MG tablet                Orders Needing Specimen Collection     None      Pending Results     No orders found from 10/12/2017 to 10/15/2017.            Pending Culture Results     No orders found from 10/12/2017 to 10/15/2017.            Pending Results Instructions     If you had any lab results that were not finalized at the time of your Discharge, you can call the ED Lab Result RN at [phone]. You will be contacted by this team for any positive Lab results or changes in treatment. The nurses are available 7 days a week from 10A to 6:30P.  You can leave a  "message 24 hours per day and they will return your call.        Test Results From Your Hospital Stay               Thank you for choosing [facility]       Thank you for choosing [facility] for your care. Our goal is always to provide you with excellent care. Hearing back from our patients is one way we can continue to improve our services. Please take a few minutes to complete the written survey that you may receive in the mail after you visit with us. Thank you!        [employer] Information     [employer] lets you send messages to your doctor, view your test results, renew your prescriptions, schedule appointments and more. To sign up, go to [email or web address] . Click on \"Log in\" on the left side of the screen, which will take you to the Welcome page. Then click on \"Sign up Now\" on the right side of the page.     You will be asked to enter the access code listed below, as well as some personal information. Please follow the directions to create your username and password.     Your access code is: [email or web address]  Expires: 2017  6:48 PM     Your access code will  in 90 days. If you need help or a new code, please call your [facility] clinic or [phone].        Care EveryWhere ID     This is your Care EveryWhere ID. This could be used by other organizations to access your [facility] medical records  [id number]        Equal Access to Services     ALEXANDER BELL AH: Hadtawanna maxwell Sosukhdev, waaxda luqadaha, qaybta kaalmada adeegyoly, carol zimmer . So [facility] [phone].    ATENCIÓN: Si habla español, tiene a sebastian disposición servicios gratuitos de asistencia lingüística. Llame al [phone].    We comply with applicable federal civil rights laws and [place] laws. We do not discriminate on the basis of race, color, national origin, age, disability, sex, sexual orientation, or gender identity.            After Visit Summary       This is your record. Keep this with you and show to " your community pharmacist(s) and doctor(s) at your next visit.

## 2017-10-25 ENCOUNTER — OFFICE VISIT (OUTPATIENT)
Dept: ORTHOPEDICS | Facility: CLINIC | Age: 32
End: 2017-10-25
Payer: COMMERCIAL

## 2017-10-25 ENCOUNTER — RADIANT APPOINTMENT (OUTPATIENT)
Dept: GENERAL RADIOLOGY | Facility: CLINIC | Age: 32
End: 2017-10-25
Attending: PEDIATRICS
Payer: COMMERCIAL

## 2017-10-25 VITALS
HEIGHT: 68 IN | WEIGHT: 139.2 LBS | DIASTOLIC BLOOD PRESSURE: 83 MMHG | BODY MASS INDEX: 21.1 KG/M2 | SYSTOLIC BLOOD PRESSURE: 136 MMHG

## 2017-10-25 DIAGNOSIS — M54.59 MECHANICAL LOW BACK PAIN: Primary | ICD-10-CM

## 2017-10-25 DIAGNOSIS — M54.50 LUMBAGO: ICD-10-CM

## 2017-10-25 PROCEDURE — 99203 OFFICE O/P NEW LOW 30 MIN: CPT | Performed by: PEDIATRICS

## 2017-10-25 PROCEDURE — 72100 X-RAY EXAM L-S SPINE 2/3 VWS: CPT

## 2017-10-25 NOTE — MR AVS SNAPSHOT
"              After Visit Summary   10/25/2017    Julio Mccurdy    MRN: [id number]           Patient Information     Date Of Birth          1985        Visit Information        Provider Department      10/25/2017 11:20 AM Noreen Sanchez MD [facility]        Today's Diagnoses     Lumbago    -  1    Mechanical low back pain          Care Instructions    Plan:  - Today's Plan of Care:  Over the counter medication: Naproxen (Aleve) maximum of 440mg two times a day with food  Physical therapy referral    -We also discussed other future treatment options:  MRI    Follow Up: 6 - 8 weeks              Follow-ups after your visit        Additional Services     PHYSICAL THERAPY REFERRAL       *This therapy referral will be filtered to a centralized scheduling office at [facility] and the patient will receive a call to schedule an appointment at a [facility] location most convenient for them. *     [facility] provides Physical Therapy evaluation and treatment and many specialty services across the [facility] system.  If requesting a specialty program, please choose from the list below.    If you have not heard from the scheduling office within 2 business days, please call [phone] for all locations, with the exception of [place], please call [phone].  Treatment: Evaluation & Treatment  Special Instructions/Modalities: none  Special Programs: None    Please be aware that coverage of these services is subject to the terms and limitations of your health insurance plan.  Call member services at your health plan with any benefit or coverage questions.      **Note to Provider:  If you are referring outside of [facility] for the therapy appointment, please list the name of the location in the \"special instructions\" above, print the referral and give to the patient to schedule the appointment.                  Who to contact     If you have questions " "or need follow up information about today's clinic visit or your schedule please contact [facility] directly at [phone].  Normal or non-critical lab and imaging results will be communicated to you by MyChart, letter or phone within 4 business days after the clinic has received the results. If you do not hear from us within 7 days, please contact the clinic through [employer] or phone. If you have a critical or abnormal lab result, we will notify you by phone as soon as possible.  Submit refill requests through [employer] or call your pharmacy and they will forward the refill request to us. Please allow 3 business days for your refill to be completed.          Additional Information About Your Visit        [employer] Information     [employer] lets you send messages to your doctor, view your test results, renew your prescriptions, schedule appointments and more. To sign up, go to [email or web address] . Click on \"Log in\" on the left side of the screen, which will take you to the Welcome page. Then click on \"Sign up Now\" on the right side of the page.     You will be asked to enter the access code listed below, as well as some personal information. Please follow the directions to create your username and password.     Your access code is: [email or web address]  Expires: 2017  6:48 PM     Your access code will  in 90 days. If you need help or a new code, please call your [facility] clinic or [phone].        Care EveryWhere ID     This is your Care EveryWhere ID. This could be used by other organizations to access your [facility] medical records  [id number]        Your Vitals Were     Height BMI (Body Mass Index)                5' 8\" (1.727 m) 21.17 kg/m2           Blood Pressure from Last 3 Encounters:   10/25/17 136/83   10/14/17 120/67   10/02/17 128/84    Weight from Last 3 Encounters:   10/25/17 139 lb 3.2 oz (63.1 kg)   10/14/17 145 lb 1 oz (65.8 kg)   10/02/17 145 lb (65.8 kg)    "           We Performed the Following     PHYSICAL THERAPY REFERRAL        Primary Care Provider Office Phone # Fax #    [facility] [phone] [address] [place] [id number]        Equal Access to Services     ALEXANDER APARICIO: Hadii aad ku hadtyroneo Sosukhdev, waaxda luqadaha, qaybta kaalmada ademohan, carol krishnamurthy mesha aparicio. So [facility] [phone].    ATENCIÓN: Si habla español, tiene a sebastian disposición servicios gratuitos de asistencia lingüística. Llame al [phone].    We comply with applicable federal civil rights laws and [place] laws. We do not discriminate on the basis of race, color, national origin, age, disability, sex, sexual orientation, or gender identity.            Thank you!     Thank you for choosing [facility]  for your care. Our goal is always to provide you with excellent care. Hearing back from our patients is one way we can continue to improve our services. Please take a few minutes to complete the written survey that you may receive in the mail after your visit with us. Thank you!             Your Updated Medication List - Protect others around you: Learn how to safely use, store and throw away your medicines at www.disposemymeds.org.          This list is accurate as of: 10/25/17 12:34 PM.  Always use your most recent med list.                   Brand Name Dispense Instructions for use Diagnosis    albuterol 90 MCG/ACT inhaler     1 Inhaler    Inhale 2 puffs into the lungs every 4 hours as needed for shortness of breath / dyspnea.        ketorolac 10 MG tablet    TORADOL    20 tablet    Take 1 tablet (10 mg) by mouth every 6 hours as needed for moderate pain        NO ACTIVE MEDICATIONS           oxyCODONE-acetaminophen 5-325 MG per tablet    PERCOCET    18 tablet    Take 1 tablet by mouth every 4 hours as needed for pain maximum 6 tablet(s) per day        sulindac 200 MG tablet    CLINORIL    20  tablet    Take 1 tablet (200 mg) by mouth 2 times daily (with meals)

## 2017-10-25 NOTE — PATIENT INSTRUCTIONS
Plan:  - Today's Plan of Care:  Over the counter medication: Naproxen (Aleve) maximum of 440mg two times a day with food  Physical therapy referral    -We also discussed other future treatment options:  MRI    Follow Up: 6 - 8 weeks

## 2017-10-25 NOTE — PROGRESS NOTES
"Sports Medicine Clinic Visit    PCP: Clinic, [facility]    Julio Mccurdy is a 32 year old male who is seen  in consultation at the request of Bhupinder Bynum M.D. presenting with midline low back pain.    Injury: Patient reports that he woke up with low back pain around 3 weeks ago. He denies any injury. Started after he was laying on his back working on his truck putting brake lines and. He denies radiating pain down his legs, numbness or tingling.    Julio was asked to complete the Oswestry Low Back Disability Index and Aleshia Start Back screening tool.  today in the office.  Disability score: 32%.     Location of Pain: midline low back   Duration of Pain: 10/2/2017    Rating of Pain at worst: 9/10  Rating of Pain Currently: 6/10  Symptoms are better with: Ice and Other medications: Lidocaine patch  Symptoms are worse with: extension, flexion, sitting more than about 30-60 minutes and standing  Additional Features:   Positive: weakness, numbness intermittent into the legs bilaterally to the back of the knee with prolonged sitting   Negative: swelling, bruising, popping, grinding, catching, locking, instability and paresthesias  Other evaluation and/or treatments so far consists of: Ice and Other medications: Lidocaine patch, lying down  Prior History of related problems: About a year ago but not to this extent, no treatment at that time    Social History: [place]    Review of Systems  Skin: no bruising, no swelling  Musculoskeletal: as above  Neurologic: no numbness, paresthesias  Remainder of review of systems is negative including constitutional, CV, pulmonary, GI, except as noted in HPI or medical history.    Patient's current problem list, past medical and surgical history, and family history were reviewed.    There is no problem list on file for this patient.    No past medical history on file.  No past surgical history on file.  No family history on file.    Objective  /83  Ht 5' 8\" (1.727 " m)  Wt 139 lb 3.2 oz (63.1 kg)  BMI 21.17 kg/m2    GENERAL APPEARANCE: healthy, alert and no distress   GAIT: NORMAL  SKIN: no suspicious lesions or rashes  HEENT: Sclera clear, anicteric  CV: good peripheral pulses  RESP: Breathing not labored  NEURO: Normal strength and tone, mentation intact and speech normal  PSYCH:  mentation appears normal and affect normal/bright    Low back exam:  Inspection:     no visible deformity in the low back       normal skin       normal vascular       normal lymphatic       no asymmetry    Posture:      lumbar lordosis diminished    Foot Inspection:     no deformity noted    Tender:     paraspinal muscles lumbar spine    Non Tender:     remainder of lumbar spine    ROM:      limited flexion due to pain       limited extension due to pain    Strength:     hip flexion 5/5 bilateral       knee extension 5/5 bilateral       ankle dorsiflexion 5/5 bilateral       ankle plantarflexion 5/5 bilateral       dorsiflexion of the great toe 5/5 bilateral       able to heel and toe walk    Reflexes:     patellar (L3, L4) symmetric normal       achilles tendons (S1) symmetric normal    Sensation:    grossly intact throughout lower extremities    Special tests:      straight leg raise left negative        straight leg raise right negative       neg (-) AVRIL  bilateral       neg (-) FADIR  bilateral    Radiology  I ordered, visualized and reviewed these images with the patient  LUMBAR SPINE TWO TO THREE VIEWS  10/25/2017 12:08 PM   HISTORY: Low back pain  COMPARISON: None.  IMPRESSION: Five functional lumbar vertebral segments are seen.  Minimal curvature convex to the left may be positional and alignment  is otherwise normal. No disc space narrowing and no acute bony  abnormality.    Assessment:  1. Mechanical low back pain      Mechanical low back pain without radiation.  I recommend physical therapy for overall core strength, would consider further imaging pending clinical course.    Plan:  -  Today's Plan of Care:  Over the counter medication: Naproxen (Aleve) maximum of 440mg two times a day with food  Physical therapy referral    -We also discussed other future treatment options:  MRI    Follow Up: 6 - 8 weeks    Concerning signs and symptoms were reviewed.  The patient expressed understanding of this management plan and all questions were answered at this time.    Thanks for the opportunity to participate in the care of this patient, I will keep you updated on their progress.    CC: Bhupinder Bynum M.D.    Noreen Sanchez MD [facility]  Primary Care Sports Medicine  [facility]

## 2022-06-01 ENCOUNTER — OFFICE VISIT (OUTPATIENT)
Dept: OPTOMETRY | Facility: CLINIC | Age: 37
End: 2022-06-01
Payer: COMMERCIAL

## 2022-06-01 DIAGNOSIS — Z01.00 ROUTINE EYE EXAM: Primary | ICD-10-CM

## 2022-06-01 DIAGNOSIS — H52.4 PRESBYOPIA: ICD-10-CM

## 2022-06-01 DIAGNOSIS — H02.88A MEIBOMIAN GLAND DYSFUNCTION (MGD) OF UPPER AND LOWER LIDS OF BOTH EYES: ICD-10-CM

## 2022-06-01 DIAGNOSIS — H00.022 HORDEOLUM INTERNUM OF RIGHT LOWER EYELID: ICD-10-CM

## 2022-06-01 DIAGNOSIS — H02.88B MEIBOMIAN GLAND DYSFUNCTION (MGD) OF UPPER AND LOWER LIDS OF BOTH EYES: ICD-10-CM

## 2022-06-01 PROCEDURE — 92004 COMPRE OPH EXAM NEW PT 1/>: CPT | Performed by: OPTOMETRIST

## 2022-06-01 PROCEDURE — 92015 DETERMINE REFRACTIVE STATE: CPT | Performed by: OPTOMETRIST

## 2022-06-01 RX ORDER — ERYTHROMYCIN 5 MG/G
0.5 OINTMENT OPHTHALMIC
Qty: 3.5 G | Refills: 1 | Status: SHIPPED | OUTPATIENT
Start: 2022-06-01 | End: 2022-06-08

## 2022-06-01 ASSESSMENT — SLIT LAMP EXAM - LIDS: COMMENTS: 3+ MEIBOMIAN GLAND DYSFUNCTION

## 2022-06-01 ASSESSMENT — KERATOMETRY
OD_K2POWER_DIOPTERS: 42.00
OD_AXISANGLE2_DEGREES: 23
OS_AXISANGLE2_DEGREES: 43
OS_K2POWER_DIOPTERS: 42.50
OD_K1POWER_DIOPTERS: 42.50
OS_K1POWER_DIOPTERS: 41.75

## 2022-06-01 ASSESSMENT — CONF VISUAL FIELD
OD_NORMAL: 1
OS_NORMAL: 1
METHOD: COUNTING FINGERS

## 2022-06-01 ASSESSMENT — REFRACTION_MANIFEST
OS_SPHERE: +0.50
OD_CYLINDER: +0.50
OS_CYLINDER: +0.75
OS_AXIS: 122
OD_CYLINDER: SPHERE
OD_AXIS: 018
OD_SPHERE: +0.25
OS_CYLINDER: +0.50
OD_SPHERE: +0.75
OS_SPHERE: +0.75
METHOD_AUTOREFRACTION: 1
OS_AXIS: 175

## 2022-06-01 ASSESSMENT — VISUAL ACUITY
OD_SC: 20/20
OS_SC: 20/20
METHOD: SNELLEN - LINEAR
OD_SC: 20/20
OD_SC+: -1
OS_SC: 20/25
OS_SC+: -2

## 2022-06-01 ASSESSMENT — TONOMETRY
OS_IOP_MMHG: 18
IOP_METHOD: APPLANATION
OD_IOP_MMHG: 18

## 2022-06-01 ASSESSMENT — CUP TO DISC RATIO
OD_RATIO: 0.5
OS_RATIO: 0.5

## 2022-06-01 NOTE — LETTER
6/1/2022         RE: Julio Mccurdy  [address] [address]        Dear Colleague,    Thank you for referring your patient, Julio Mccurdy, to the [facility]. Please see a copy of my visit note below.    Chief Complaint   Patient presents with     COMPREHENSIVE EYE EXAM         Last Eye Exam: 1+ years   Dilated Previously: Yes    What are you currently using to see?  does not use glasses or contacts       Distance Vision Acuity: Satisfied with vision, sometimes gets cloudy or goopy upon waking , sometimes midday - worse with active stye - he is able to blink that away.     Near Vision Acuity: Satisfied with vision while reading and using computer unaided    Eye Comfort: watery, mattery and has had several styes both lower lids- sees a tender white dot,   Treatment Soak eye in shower , some warm compress, erythromycin ointment helped most  in past, reports no itch  Except with  spring  allergies  Do you use eye drops? 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       OBJECTIVE: See Ophthalmology exam    ASSESSMENT:    ICD-10-CM    1. Routine eye exam  Z01.00 EYE EXAM (SIMPLE-NONBILLABLE)     REFRACTION   2. Presbyopia  H52.4 EYE EXAM (SIMPLE-NONBILLABLE)     REFRACTION   3. Meibomian gland dysfunction (MGD) of upper and lower lids of both eyes  H02.88A EYE EXAM (SIMPLE-NONBILLABLE)    H02.88B REFRACTION     erythromycin (ROMYCIN) 5 MG/GM ophthalmic ointment   4. Hordeolum internum of right lower eyelid  H00.022 EYE EXAM (SIMPLE-NONBILLABLE)     REFRACTION     erythromycin (ROMYCIN) 5 MG/GM ophthalmic ointment    frequent due to sora[employer] work conditions       PLAN:     Patient Instructions   Use warm  compresses 10 minutes 2 times a day - Kristine warm compress mask- holds heat  well from microwave and moisture  too.  Be careful not to burn eyelids.     OCuSOFT HypoChlor 0.02% Eyelid and Eyelash Spray 2 fl. OZ -Available at [facility]  Use twice daily.  Spray cotton swab or cotton pad 3 times, rub gently 3 times along upper and then 3 times along lower lashes and then both lids. Use new applicator for other eye.    Use over the counter artificial tears 2 times a day (Soothe XP-Xtra Protection (white liquid), Systane Balance  (white liquid) or Refresh Complete).  These are oil based.    Erinn Campos O.D.  [facility]  [address] [place] [address] [phone]                   Again, thank you for allowing me to participate in the care of your patient.        Sincerely,        Erinn Campos, OD

## 2022-06-01 NOTE — PROGRESS NOTES
Chief Complaint   Patient presents with     COMPREHENSIVE EYE EXAM         Last Eye Exam: 1+ years   Dilated Previously: Yes    What are you currently using to see?  does not use glasses or contacts       Distance Vision Acuity: Satisfied with vision, sometimes gets cloudy or goopy upon waking , sometimes midday - worse with active stye - he is able to blink that away.     Near Vision Acuity: Satisfied with vision while reading and using computer unaided    Eye Comfort: watery, mattery and has had several styes both lower lids- sees a tender white dot,   Treatment Soak eye in shower , some warm compress, erythromycin ointment helped most  in past, reports no itch  Except with  spring  allergies  Do you use eye drops? 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       OBJECTIVE: See Ophthalmology exam    ASSESSMENT:    ICD-10-CM    1. Routine eye exam  Z01.00 EYE EXAM (SIMPLE-NONBILLABLE)     REFRACTION   2. Presbyopia  H52.4 EYE EXAM (SIMPLE-NONBILLABLE)     REFRACTION   3. Meibomian gland dysfunction (MGD) of upper and lower lids of both eyes  H02.88A EYE EXAM (SIMPLE-NONBILLABLE)    H02.88B REFRACTION     erythromycin (ROMYCIN) 5 MG/GM ophthalmic ointment   4. Hordeolum internum of right lower eyelid  H00.022 EYE EXAM (SIMPLE-NONBILLABLE)     REFRACTION     erythromycin (ROMYCIN) 5 MG/GM ophthalmic ointment    frequent due to [employer] work conditions       PLAN:     Patient Instructions   Use warm compresses 10 minutes 2 times a day - Kristine warm compress mask- holds heat  well from microwave and moisture  too.  Be careful not to burn eyelids.     OCuSOFT HypoChlor 0.02% Eyelid and Eyelash Spray 2 fl. OZ -Available at [facility]  Use twice daily.  Spray cotton swab or cotton pad 3 times, rub gently 3 times along upper and then 3 times along lower lashes and then both lids. Use new applicator for other eye.    Use over the counter artificial tears 2 times a day (Soothe XP-Xtra Protection (white liquid), Systane Balance  (white liquid) or Refresh Complete).  These are oil based.    Erinn Campos O.D.  [facility]  [address] [place] [address] [phone]

## 2022-06-01 NOTE — PATIENT INSTRUCTIONS
Use warm compresses 10 minutes 2 times a day - Kristine warm compress mask- holds heat  well from microwave and moisture  too.  Be careful not to burn eyelids.     OCuSOFT HypoChlor 0.02% Eyelid and Eyelash Spray 2 fl. OZ -Available at [facility]  Use twice daily.  Spray cotton swab or cotton pad 3 times, rub gently 3 times along upper and then 3 times along lower lashes and then both lids. Use new applicator for other eye.    Use over the counter artificial tears 2 times a day (Soothe XP-Xtra Protection (white liquid), Systane Balance  (white liquid) or Refresh Complete).  These are oil based.    Erinn Campos O.D.  [facility]  [address] [place] [address] [phone]

## 2024-08-29 ENCOUNTER — HOSPITAL ENCOUNTER (EMERGENCY)
Facility: CLINIC | Age: 39
Discharge: HOME | End: 2024-08-29
Attending: PHYSICIAN ASSISTANT | Admitting: PHYSICIAN ASSISTANT
Payer: COMMERCIAL

## 2024-08-29 ENCOUNTER — APPOINTMENT (OUTPATIENT)
Dept: GENERAL RADIOLOGY | Facility: CLINIC | Age: 39
End: 2024-08-29
Attending: PHYSICIAN ASSISTANT
Payer: COMMERCIAL

## 2024-08-29 VITALS
HEART RATE: 82 BPM | DIASTOLIC BLOOD PRESSURE: 73 MMHG | SYSTOLIC BLOOD PRESSURE: 130 MMHG | TEMPERATURE: 100 F | OXYGEN SATURATION: 93 % | RESPIRATION RATE: 29 BRPM

## 2024-08-29 DIAGNOSIS — J06.9 VIRAL URI WITH COUGH: ICD-10-CM

## 2024-08-29 DIAGNOSIS — H66.92 ACUTE LEFT OTITIS MEDIA: ICD-10-CM

## 2024-08-29 DIAGNOSIS — H61.22 IMPACTED CERUMEN OF LEFT EAR: ICD-10-CM

## 2024-08-29 DIAGNOSIS — J98.01 ACUTE BRONCHOSPASM: ICD-10-CM

## 2024-08-29 PROCEDURE — 71046 X-RAY EXAM CHEST 2 VIEWS: CPT

## 2024-08-29 PROCEDURE — 69209 REMOVE IMPACTED EAR WAX UNI: CPT | Mod: LT | Performed by: PHYSICIAN ASSISTANT

## 2024-08-29 PROCEDURE — 99204 OFFICE O/P NEW MOD 45 MIN: CPT | Mod: 25 | Performed by: PHYSICIAN ASSISTANT

## 2024-08-29 PROCEDURE — 69209 REMOVE IMPACTED EAR WAX UNI: CPT | Performed by: PHYSICIAN ASSISTANT

## 2024-08-29 PROCEDURE — G0463 HOSPITAL OUTPT CLINIC VISIT: HCPCS | Mod: 25 | Performed by: PHYSICIAN ASSISTANT

## 2024-08-29 RX ORDER — PREDNISONE 20 MG/1
TABLET ORAL
Qty: 10 TABLET | Refills: 0 | Status: SHIPPED | OUTPATIENT
Start: 2024-08-29 | End: 2024-08-29

## 2024-08-29 RX ORDER — PREDNISONE 20 MG/1
TABLET ORAL
Qty: 10 TABLET | Refills: 0 | Status: SHIPPED | OUTPATIENT
Start: 2024-08-29

## 2024-08-29 RX ORDER — ALBUTEROL SULFATE 90 UG/1
2 AEROSOL, METERED RESPIRATORY (INHALATION) EVERY 6 HOURS PRN
Qty: 18 G | Refills: 0 | Status: SHIPPED | OUTPATIENT
Start: 2024-08-29

## 2024-08-29 RX ORDER — ALBUTEROL SULFATE 90 UG/1
2 AEROSOL, METERED RESPIRATORY (INHALATION) EVERY 6 HOURS PRN
Qty: 18 G | Refills: 0 | Status: SHIPPED | OUTPATIENT
Start: 2024-08-29 | End: 2024-08-29

## 2024-08-29 ASSESSMENT — COLUMBIA-SUICIDE SEVERITY RATING SCALE - C-SSRS
1. IN THE PAST MONTH, HAVE YOU WISHED YOU WERE DEAD OR WISHED YOU COULD GO TO SLEEP AND NOT WAKE UP?: NO
2. HAVE YOU ACTUALLY HAD ANY THOUGHTS OF KILLING YOURSELF IN THE PAST MONTH?: NO
6. HAVE YOU EVER DONE ANYTHING, STARTED TO DO ANYTHING, OR PREPARED TO DO ANYTHING TO END YOUR LIFE?: NO

## 2024-08-29 ASSESSMENT — ENCOUNTER SYMPTOMS
FEVER: 0
COUGH: 1
CONSTITUTIONAL NEGATIVE: 1
WHEEZING: 1
SHORTNESS OF BREATH: 1

## 2024-08-29 ASSESSMENT — ACTIVITIES OF DAILY LIVING (ADL): ADLS_ACUITY_SCORE: 35

## 2024-08-29 NOTE — ED PROVIDER NOTES
History     Chief Complaint   Patient presents with    Ear Fullness     Left ear fullness     Cough     Deep cough x's 1 week   Tightness and shortness of breath      HPI  Julio Mccurdy is a 39 year old male who presents to Urgent Care with complaints of left ear fullness and cough.  Patient states he has had a cough for the past week with associated chest tightness, wheezing, and shortness of breath.  Denies fevers, chills, sinus pressure, nasal congestion, nausea, vomiting, diarrhea, abdominal pain, or chest pain.  Patient states he is a past smoker.  Also has a past history of asthma.  Does not currently have any inhalers.      Allergies:  No Known Allergies    Problem List:    Patient Active Problem List    Diagnosis Date Noted    Meibomian gland dysfunction (MGD) of upper and lower lids of both eyes 06/01/2022     Priority: Medium    Hordeolum internum of right lower eyelid 06/01/2022     Priority: Medium     frequent due to soraida work conditions          Past Medical History:    No past medical history on file.    Past Surgical History:    No past surgical history on file.    Family History:    Family History   Problem Relation Age of Onset    Glaucoma No family hx of     Macular Degeneration No family hx of        Social History:  Marital Status:  Single [1]  Social History     Tobacco Use    Smoking status: Every Day     Current packs/day: 1.00     Types: Cigarettes    Smokeless tobacco: Former   Substance Use Topics    Alcohol use: No    Drug use: No        Medications:    albuterol (PROAIR HFA/PROVENTIL HFA/VENTOLIN HFA) 108 (90 Base) MCG/ACT inhaler  amoxicillin-clavulanate (AUGMENTIN) 875-125 MG tablet  predniSONE (DELTASONE) 20 MG tablet  albuterol 90 MCG/ACT inhaler  ketorolac (TORADOL) 10 MG tablet  NO ACTIVE MEDICATIONS  oxyCODONE-acetaminophen (PERCOCET) 5-325 MG per tablet  sulindac (CLINORIL) 200 MG tablet          Review of Systems   Constitutional: Negative.  Negative for fever.   HENT:           Ear fullness   Respiratory:  Positive for cough, shortness of breath and wheezing.    All other systems reviewed and are negative.      Physical Exam   BP: 130/73  Pulse: 82  Temp: 100  F (37.8  C)  Resp: 29  SpO2: 93 %      Physical Exam  Constitutional:       General: He is not in acute distress.     Appearance: Normal appearance. He is well-developed. He is not ill-appearing, toxic-appearing or diaphoretic.   HENT:      Head: Normocephalic and atraumatic.      Right Ear: Tympanic membrane, ear canal and external ear normal.      Left Ear: Ear canal and external ear normal. A middle ear effusion is present. There is impacted cerumen. Tympanic membrane is erythematous and bulging.      Ears:      Comments: After irrigation of left cerumen impaction, left TM is erythematous, dull, and with a purulent middle ear effusion present.     Nose: Nose normal. No congestion or rhinorrhea.      Mouth/Throat:      Lips: Pink.      Mouth: Mucous membranes are moist.      Pharynx: Oropharynx is clear. Uvula midline. No pharyngeal swelling, oropharyngeal exudate, posterior oropharyngeal erythema or uvula swelling.      Tonsils: No tonsillar exudate or tonsillar abscesses.   Eyes:      Extraocular Movements: Extraocular movements intact.      Conjunctiva/sclera: Conjunctivae normal.      Pupils: Pupils are equal, round, and reactive to light.   Cardiovascular:      Rate and Rhythm: Normal rate and regular rhythm.      Heart sounds: Normal heart sounds.   Pulmonary:      Effort: Pulmonary effort is normal. No respiratory distress.      Breath sounds: No stridor. Wheezing present. No rhonchi or rales.      Comments: Diffuse expiratory wheezing throughout lung fields on exam  Musculoskeletal:         General: Normal range of motion.      Cervical back: Full passive range of motion without pain, normal range of motion and neck supple. No rigidity. Normal range of motion.   Lymphadenopathy:      Cervical: No cervical adenopathy.    Skin:     General: Skin is warm and dry.   Neurological:      Mental Status: He is alert and oriented to person, place, and time.   Psychiatric:         Behavior: Behavior is cooperative.         ED Course        Procedures      Results for orders placed or performed during the hospital encounter of 08/29/24 (from the past 24 hour(s))   XR Chest 2 Views    Narrative    EXAM: XR CHEST 2 VIEWS  LOCATION: [facility]  DATE: 8/29/2024    INDICATION: cough, wheezing  COMPARISON: 1/16/2024.      Impression    IMPRESSION: Negative chest.       Medications - No data to display    Assessments & Plan (with Medical Decision Making)     Pt is a 39 year old male who presents to Urgent Care with complaints of left ear fullness and cough.  Patient states he has had a cough for the past week with associated chest tightness, wheezing, and shortness of breath.  Patient states he is a past smoker.  Also has a past history of asthma.  Does not currently have any inhalers.    Pt is afebrile on arrival.  Exam as above.  O2 sats of 93% on room air.  Patient has noted to have diffuse expiratory wheezing throughout lung fields on exam.  Chest x-ray was negative for pneumonia or acute pathology.  X-rays were reviewed by radiology as well as independently reviewed by myself.  Discussed results with patient.  Encouraged symptomatic treatments at home.  Return precautions were reviewed.  Hand-outs were provided.    Patient was sent with Augmentin for otitis media, Prednisone and Albuterol inhaler and was instructed to follow-up with PCP for continued care and management.  He is to return to the ED for persistent and/or worsening symptoms.  Patient expressed understanding of the diagnosis and plan and was discharged home in good condition.    I have reviewed the nursing notes.    I have reviewed the findings, diagnosis, plan and need for follow up with the patient.    Discharge Medication List as of 8/29/2024  5:36  PM        START taking these medications    Details   albuterol (PROAIR HFA/PROVENTIL HFA/VENTOLIN HFA) 108 (90 Base) MCG/ACT inhaler Inhale 2 puffs into the lungs every 6 hours as needed for shortness of breath or wheezing., Disp-18 g, R-0, E-Prescribe      amoxicillin-clavulanate (AUGMENTIN) 875-125 MG tablet Take 1 tablet by mouth 2 times daily for 10 days., Disp-20 tablet, R-0, E-Prescribe      predniSONE (DELTASONE) 20 MG tablet Take two tablets (= 40mg) each day for 5 (five) days, Disp-10 tablet, R-0, E-Prescribe             Final diagnoses:   Acute left otitis media   Impacted cerumen of left ear   Viral URI with cough   Acute bronchospasm       8/29/2024   [facility] EMERGENCY DEPT       Fatou, Darcie Schuster PA-C  08/29/24 1944